# Patient Record
(demographics unavailable — no encounter records)

---

## 2024-12-04 NOTE — PHYSICAL EXAM
[Normal Appearance] : normal appearance [Well Groomed] : well groomed [General Appearance - In No Acute Distress] : no acute distress [Edema] : no peripheral edema [Respiration, Rhythm And Depth] : normal respiratory rhythm and effort [Exaggerated Use Of Accessory Muscles For Inspiration] : no accessory muscle use [Abdomen Soft] : soft [Abdomen Tenderness] : non-tender [Costovertebral Angle Tenderness] : no ~M costovertebral angle tenderness [Normal Station and Gait] : the gait and station were normal for the patient's age [] : no rash [No Focal Deficits] : no focal deficits [Oriented To Time, Place, And Person] : oriented to person, place, and time [Affect] : the affect was normal [Mood] : the mood was normal [No Palpable Adenopathy] : no palpable adenopathy [de-identified] : Able to retract foreskin.  Short frenulum. [Chaperone Present] : A chaperone was present in the examining room during all aspects of the physical examination [FreeTextEntry2] : JESSICA Parra

## 2024-12-04 NOTE — ASSESSMENT
[FreeTextEntry1] : Mr. Mazariegos is a very pleasant 21 year old man here today for phimosis. -We discussed options for management moving forward, including primarily observation, circumcision, frenulectomy.  -At this time he would like to think about his options prior to proceeding -We discussed risks and benefits of a circumcision versus a frenulectomy -At this time he would like to consider his options. -HgbA1c. -Will call us if he decides to move forward with circumcision vs. frenulectomy. -Follow-up in 6 months if he decides that he wishes to wait on surgery  Patient is being seen today for evaluation and management of a chronic and longitudinal ongoing condition and I am the primary treating physician

## 2024-12-04 NOTE — HISTORY OF PRESENT ILLNESS
[None] : None [FreeTextEntry1] : Mr. Mazariegos is a very pleasant 21 year old man here today for phimosis, short frenulum. He reports intermittent issues with retracting his foreskin, not currently however. He reports mild curvature of head of penis downward. He reports this makes intercourse uncomfortable. He denies any past  procedures, balanitis, dysuria or hematuria. Denies any family history of urological cancers. Denies any blood thinner or ASA use. Denies any history of smoking.

## 2025-03-12 NOTE — ASSESSMENT
[FreeTextEntry1] : Very pleasant 22-year-old gentleman who presents for follow-up of phimosis, short frenulum -I discussed the risks, benefits and alternatives to circumcision with the patient, including but not limited to bleeding, infection, pain, irritation of the glans, and penile injury.  I also discussed the need to refrain from sexual intercourse for 6 weeks, or until the circumcision incision heals.  The patient wishes to proceed and we will schedule the surgery for the near future.  Patient is being seen today for evaluation and management of a chronic and longitudinal ongoing condition and I am the primary treating physician

## 2025-03-12 NOTE — HISTORY OF PRESENT ILLNESS
[None] : None [FreeTextEntry1] : Very pleasant 22-year-old who presents for follow-up of phimosis, short frenulum.  He reports that he has thought about a circumcision and is now interested in proceeding.  He presents today to discuss the procedure as well as scheduling.

## 2025-03-12 NOTE — PHYSICAL EXAM
[Normal Appearance] : normal appearance [Well Groomed] : well groomed [General Appearance - In No Acute Distress] : no acute distress [Edema] : no peripheral edema [Respiration, Rhythm And Depth] : normal respiratory rhythm and effort [Exaggerated Use Of Accessory Muscles For Inspiration] : no accessory muscle use [Abdomen Soft] : soft [Abdomen Tenderness] : non-tender [Costovertebral Angle Tenderness] : no ~M costovertebral angle tenderness [Normal Station and Gait] : the gait and station were normal for the patient's age [] : no rash [No Focal Deficits] : no focal deficits [Oriented To Time, Place, And Person] : oriented to person, place, and time [Affect] : the affect was normal [Mood] : the mood was normal [No Palpable Adenopathy] : no palpable adenopathy [Chaperone Present] : A chaperone was present in the examining room during all aspects of the physical examination [de-identified] : Able to retract foreskin.  Short frenulum. [FreeTextEntry2] : JESSICA Parra

## 2025-05-02 NOTE — ASSESSMENT
[FreeTextEntry1] : Very pleasant 22-year-old gentleman who presents for follow-up of phimosis status post circumcision - Circumcision incision well-healed - Discussed the importance of refraining from sexual activity for an additional 2 weeks - Follow-up in 6 months or sooner if necessary  Patient is being seen today for evaluation and management of a chronic and longitudinal ongoing condition and I am the primary treating physician

## 2025-05-02 NOTE — HISTORY OF PRESENT ILLNESS
[FreeTextEntry1] : Very pleasant 22-year-old gentleman who presents for follow-up of phimosis status post circumcision.  He underwent a circumcision 4/5/2025.  He reports no problems after the procedure.  He reports no bleeding.  He reports that the sutures have all dissolved after the procedure.  He has not become sexually active yet.

## 2025-05-02 NOTE — PHYSICAL EXAM
[Normal Appearance] : normal appearance [Well Groomed] : well groomed [General Appearance - In No Acute Distress] : no acute distress [Edema] : no peripheral edema [Respiration, Rhythm And Depth] : normal respiratory rhythm and effort [Exaggerated Use Of Accessory Muscles For Inspiration] : no accessory muscle use [Abdomen Soft] : soft [Abdomen Tenderness] : non-tender [Costovertebral Angle Tenderness] : no ~M costovertebral angle tenderness [Normal Station and Gait] : the gait and station were normal for the patient's age [] : no rash [No Focal Deficits] : no focal deficits [Affect] : the affect was normal [Oriented To Time, Place, And Person] : oriented to person, place, and time [Mood] : the mood was normal [No Palpable Adenopathy] : no palpable adenopathy [de-identified] : Circumcision incision well-healed [Chaperone Present] : A chaperone was present in the examining room during all aspects of the physical examination [FreeTextEntry2] : JESSICA Roa

## 2025-05-02 NOTE — PHYSICAL EXAM
[Normal Appearance] : normal appearance [Well Groomed] : well groomed [General Appearance - In No Acute Distress] : no acute distress [Edema] : no peripheral edema [Respiration, Rhythm And Depth] : normal respiratory rhythm and effort [Exaggerated Use Of Accessory Muscles For Inspiration] : no accessory muscle use [Abdomen Soft] : soft [Abdomen Tenderness] : non-tender [Costovertebral Angle Tenderness] : no ~M costovertebral angle tenderness [Normal Station and Gait] : the gait and station were normal for the patient's age [] : no rash [No Focal Deficits] : no focal deficits [Affect] : the affect was normal [Oriented To Time, Place, And Person] : oriented to person, place, and time [Mood] : the mood was normal [No Palpable Adenopathy] : no palpable adenopathy [de-identified] : Circumcision incision well-healed [Chaperone Present] : A chaperone was present in the examining room during all aspects of the physical examination [FreeTextEntry2] : JESSICA Roa